# Patient Record
Sex: MALE | Race: WHITE
[De-identification: names, ages, dates, MRNs, and addresses within clinical notes are randomized per-mention and may not be internally consistent; named-entity substitution may affect disease eponyms.]

---

## 2017-04-15 ENCOUNTER — RX RENEWAL (OUTPATIENT)
Age: 62
End: 2017-04-15

## 2017-06-14 ENCOUNTER — RX RENEWAL (OUTPATIENT)
Age: 62
End: 2017-06-14

## 2017-07-24 ENCOUNTER — RESULT REVIEW (OUTPATIENT)
Age: 62
End: 2017-07-24

## 2017-07-24 ENCOUNTER — NON-APPOINTMENT (OUTPATIENT)
Age: 62
End: 2017-07-24

## 2017-07-24 ENCOUNTER — APPOINTMENT (OUTPATIENT)
Dept: INTERNAL MEDICINE | Facility: CLINIC | Age: 62
End: 2017-07-24

## 2017-07-24 DIAGNOSIS — Z98.61 CORONARY ANGIOPLASTY STATUS: ICD-10-CM

## 2017-07-24 LAB
BILIRUB UR QL STRIP: NORMAL
GLUCOSE UR-MCNC: NORMAL
HCG UR QL: 0.2 EU/DL
HGB UR QL STRIP.AUTO: NORMAL
KETONES UR-MCNC: NORMAL
LEUKOCYTE ESTERASE UR QL STRIP: NORMAL
NITRITE UR QL STRIP: NORMAL
PH UR STRIP: 5.5
PROT UR STRIP-MCNC: NORMAL
SP GR UR STRIP: 1.01

## 2017-07-25 LAB
ALBUMIN SERPL ELPH-MCNC: 4.7 G/DL
ALP BLD-CCNC: 80 U/L
ALT SERPL-CCNC: 31 U/L
ANION GAP SERPL CALC-SCNC: 18 MMOL/L
AST SERPL-CCNC: 18 U/L
BASOPHILS # BLD AUTO: 0.06 K/UL
BASOPHILS NFR BLD AUTO: 0.7 %
BILIRUB SERPL-MCNC: 0.6 MG/DL
BUN SERPL-MCNC: 22 MG/DL
CALCIUM SERPL-MCNC: 9.2 MG/DL
CHLORIDE SERPL-SCNC: 100 MMOL/L
CHOLEST SERPL-MCNC: 167 MG/DL
CHOLEST/HDLC SERPL: 4.5 RATIO
CO2 SERPL-SCNC: 21 MMOL/L
CREAT SERPL-MCNC: 1.28 MG/DL
EOSINOPHIL # BLD AUTO: 0.24 K/UL
EOSINOPHIL NFR BLD AUTO: 2.9 %
GLUCOSE SERPL-MCNC: 101 MG/DL
HCT VFR BLD CALC: 42.6 %
HCV AB SER QL: NONREACTIVE
HCV S/CO RATIO: 0.14 S/CO
HDLC SERPL-MCNC: 37 MG/DL
HGB BLD-MCNC: 14.6 G/DL
IMM GRANULOCYTES NFR BLD AUTO: 0.4 %
LDLC SERPL CALC-MCNC: 78 MG/DL
LYMPHOCYTES # BLD AUTO: 1.43 K/UL
LYMPHOCYTES NFR BLD AUTO: 17.5 %
MAN DIFF?: NORMAL
MCHC RBC-ENTMCNC: 33.4 PG
MCHC RBC-ENTMCNC: 34.3 GM/DL
MCV RBC AUTO: 97.5 FL
MONOCYTES # BLD AUTO: 0.93 K/UL
MONOCYTES NFR BLD AUTO: 11.4 %
NEUTROPHILS # BLD AUTO: 5.48 K/UL
NEUTROPHILS NFR BLD AUTO: 67.1 %
PLATELET # BLD AUTO: 252 K/UL
POTASSIUM SERPL-SCNC: 4.4 MMOL/L
PROT SERPL-MCNC: 7.5 G/DL
PSA FREE FLD-MCNC: 20.9
PSA FREE SERPL-MCNC: 0.53 NG/ML
PSA SERPL-MCNC: 2.53 NG/ML
RBC # BLD: 4.37 M/UL
RBC # FLD: 13.3 %
SODIUM SERPL-SCNC: 139 MMOL/L
TRIGL SERPL-MCNC: 262 MG/DL
TSH SERPL-ACNC: 1.52 UIU/ML
WBC # FLD AUTO: 8.17 K/UL

## 2017-09-12 ENCOUNTER — RX RENEWAL (OUTPATIENT)
Age: 62
End: 2017-09-12

## 2017-10-09 ENCOUNTER — RX RENEWAL (OUTPATIENT)
Age: 62
End: 2017-10-09

## 2017-12-11 ENCOUNTER — MEDICATION RENEWAL (OUTPATIENT)
Age: 62
End: 2017-12-11

## 2018-01-10 ENCOUNTER — RX RENEWAL (OUTPATIENT)
Age: 63
End: 2018-01-10

## 2018-01-10 ENCOUNTER — MEDICATION RENEWAL (OUTPATIENT)
Age: 63
End: 2018-01-10

## 2018-01-17 ENCOUNTER — APPOINTMENT (OUTPATIENT)
Dept: INTERNAL MEDICINE | Facility: CLINIC | Age: 63
End: 2018-01-17
Payer: COMMERCIAL

## 2018-01-17 VITALS
HEIGHT: 71 IN | OXYGEN SATURATION: 94 % | DIASTOLIC BLOOD PRESSURE: 86 MMHG | SYSTOLIC BLOOD PRESSURE: 118 MMHG | HEART RATE: 91 BPM | WEIGHT: 203 LBS | BODY MASS INDEX: 28.42 KG/M2

## 2018-01-17 DIAGNOSIS — G47.00 INSOMNIA, UNSPECIFIED: ICD-10-CM

## 2018-01-17 PROCEDURE — 36415 COLL VENOUS BLD VENIPUNCTURE: CPT

## 2018-01-17 PROCEDURE — 99214 OFFICE O/P EST MOD 30 MIN: CPT | Mod: 25

## 2018-01-17 RX ORDER — DEXLANSOPRAZOLE 60 MG/1
60 CAPSULE, DELAYED RELEASE ORAL
Refills: 0 | Status: ACTIVE | COMMUNITY
Start: 2018-01-17

## 2018-01-18 LAB
ALBUMIN SERPL ELPH-MCNC: 4.9 G/DL
ALP BLD-CCNC: 80 U/L
ALT SERPL-CCNC: 34 U/L
ANION GAP SERPL CALC-SCNC: 14 MMOL/L
AST SERPL-CCNC: 20 U/L
BASOPHILS # BLD AUTO: 0.07 K/UL
BASOPHILS NFR BLD AUTO: 0.8 %
BILIRUB SERPL-MCNC: 0.6 MG/DL
BUN SERPL-MCNC: 18 MG/DL
CALCIUM SERPL-MCNC: 10.3 MG/DL
CHLORIDE SERPL-SCNC: 102 MMOL/L
CO2 SERPL-SCNC: 26 MMOL/L
CREAT SERPL-MCNC: 1.27 MG/DL
EOSINOPHIL # BLD AUTO: 0.32 K/UL
EOSINOPHIL NFR BLD AUTO: 3.8 %
GLUCOSE SERPL-MCNC: 97 MG/DL
HBA1C MFR BLD HPLC: 5.1 %
HCT VFR BLD CALC: 45.7 %
HGB BLD-MCNC: 15.4 G/DL
IMM GRANULOCYTES NFR BLD AUTO: 0.2 %
LYMPHOCYTES # BLD AUTO: 1.8 K/UL
LYMPHOCYTES NFR BLD AUTO: 21.3 %
MAN DIFF?: NORMAL
MCHC RBC-ENTMCNC: 33.7 GM/DL
MCHC RBC-ENTMCNC: 33.7 PG
MCV RBC AUTO: 100 FL
MONOCYTES # BLD AUTO: 0.88 K/UL
MONOCYTES NFR BLD AUTO: 10.4 %
NEUTROPHILS # BLD AUTO: 5.37 K/UL
NEUTROPHILS NFR BLD AUTO: 63.5 %
PLATELET # BLD AUTO: 251 K/UL
POTASSIUM SERPL-SCNC: 4.3 MMOL/L
PROT SERPL-MCNC: 7.5 G/DL
RBC # BLD: 4.57 M/UL
RBC # FLD: 13 %
SODIUM SERPL-SCNC: 142 MMOL/L
WBC # FLD AUTO: 8.46 K/UL

## 2018-09-03 ENCOUNTER — APPOINTMENT (OUTPATIENT)
Dept: INTERNAL MEDICINE | Facility: CLINIC | Age: 63
End: 2018-09-03

## 2018-09-10 ENCOUNTER — APPOINTMENT (OUTPATIENT)
Dept: INTERNAL MEDICINE | Facility: CLINIC | Age: 63
End: 2018-09-10
Payer: COMMERCIAL

## 2018-09-10 VITALS
DIASTOLIC BLOOD PRESSURE: 90 MMHG | HEIGHT: 71 IN | SYSTOLIC BLOOD PRESSURE: 158 MMHG | OXYGEN SATURATION: 96 % | HEART RATE: 78 BPM | BODY MASS INDEX: 30.66 KG/M2 | WEIGHT: 219 LBS

## 2018-09-10 DIAGNOSIS — Z00.00 ENCOUNTER FOR GENERAL ADULT MEDICAL EXAMINATION W/OUT ABNORMAL FINDINGS: ICD-10-CM

## 2018-09-10 PROCEDURE — 90471 IMMUNIZATION ADMIN: CPT

## 2018-09-10 PROCEDURE — 99213 OFFICE O/P EST LOW 20 MIN: CPT | Mod: 25

## 2018-09-10 PROCEDURE — G0444 DEPRESSION SCREEN ANNUAL: CPT

## 2018-09-10 PROCEDURE — 90715 TDAP VACCINE 7 YRS/> IM: CPT

## 2018-09-10 PROCEDURE — G0442 ANNUAL ALCOHOL SCREEN 15 MIN: CPT

## 2018-09-10 PROCEDURE — 99396 PREV VISIT EST AGE 40-64: CPT | Mod: 25

## 2018-09-10 PROCEDURE — 36415 COLL VENOUS BLD VENIPUNCTURE: CPT

## 2018-09-10 NOTE — HISTORY OF PRESENT ILLNESS
[de-identified] : Patient presents for CPE. History is significant for hypertension, hyperlipidemia and coronary artery disease. He is on ramipril and metoprolol for hypertension, Crestor for hyperlipidemia, aspirin Plavix for coronary artery disease. He sees cardiologist, Dr. Delgado regularly. He also has a history of GERD and Negron's esophagus for which he takes excellent and sees Dr. Cabezas regularly. \par He reports that he drinks a quart of hard liquor per day. He would like to stop drinking. He stopped cold turkey in the past on his own but states he had withdrawal symptoms for a week. No withdrawal seizure/DTs. He was sober for many years, started drinking again about a year ago.

## 2018-09-10 NOTE — COUNSELING
[ - Annual Alcohol Misuse Screening] : Annual Alcohol Misuse Screening [ - Annual Depression Screening] : Annual Depression Screening

## 2018-09-10 NOTE — ASSESSMENT
[FreeTextEntry1] : Blood pressure high - continue medications - will repeat next week. \par Discussed plan for alcohol cessation. Will attempt to detox as outpatient with librium taper. States his last drink will be Friday, then will detox at home. Advised him to go to ER for severe withdrawal symptoms, high BP. Will check BP at home. \par He is current with specialists.\par Colonoscopy 10/16 with 5 year follow up.  \par Tdap given in left deltoid. \par Follow up in 1 week.

## 2018-09-10 NOTE — PHYSICAL EXAM
[No Acute Distress] : no acute distress [No Respiratory Distress] : no respiratory distress  [Clear to Auscultation] : lungs were clear to auscultation bilaterally [No Accessory Muscle Use] : no accessory muscle use [Normal Rate] : normal rate  [Regular Rhythm] : with a regular rhythm [Normal S1, S2] : normal S1 and S2 [Normal Affect] : the affect was normal [Alert and Oriented x3] : oriented to person, place, and time [Normal Mood] : the mood was normal [Normal Insight/Judgement] : insight and judgment were intact [Well Nourished] : well nourished [Well Developed] : well developed [Well-Appearing] : well-appearing [Normal Sclera/Conjunctiva] : normal sclera/conjunctiva [PERRL] : pupils equal round and reactive to light [EOMI] : extraocular movements intact [Normal Outer Ear/Nose] : the outer ears and nose were normal in appearance [Normal Oropharynx] : the oropharynx was normal [Normal TMs] : both tympanic membranes were normal [No JVD] : no jugular venous distention [Supple] : supple [No Lymphadenopathy] : no lymphadenopathy [Thyroid Normal, No Nodules] : the thyroid was normal and there were no nodules present [No Murmur] : no murmur heard [No Carotid Bruits] : no carotid bruits [No Abdominal Bruit] : a ~M bruit was not heard ~T in the abdomen [No Varicosities] : no varicosities [Pedal Pulses Present] : the pedal pulses are present [No Edema] : there was no peripheral edema [No Extremity Clubbing/Cyanosis] : no extremity clubbing/cyanosis [No Palpable Aorta] : no palpable aorta [Soft] : abdomen soft [Non Tender] : non-tender [Non-distended] : non-distended [No Masses] : no abdominal mass palpated [No HSM] : no HSM [Normal Bowel Sounds] : normal bowel sounds [Normal Posterior Cervical Nodes] : no posterior cervical lymphadenopathy [Normal Anterior Cervical Nodes] : no anterior cervical lymphadenopathy [No CVA Tenderness] : no CVA  tenderness [No Spinal Tenderness] : no spinal tenderness [No Joint Swelling] : no joint swelling [Grossly Normal Strength/Tone] : grossly normal strength/tone [No Rash] : no rash [Normal Gait] : normal gait [Coordination Grossly Intact] : coordination grossly intact [No Focal Deficits] : no focal deficits [Deep Tendon Reflexes (DTR)] : deep tendon reflexes were 2+ and symmetric

## 2018-09-11 LAB
ALBUMIN SERPL ELPH-MCNC: 5.1 G/DL
ALP BLD-CCNC: 82 U/L
ALT SERPL-CCNC: 36 U/L
ANION GAP SERPL CALC-SCNC: 14 MMOL/L
APPEARANCE: CLEAR
AST SERPL-CCNC: 24 U/L
BACTERIA: NEGATIVE
BASOPHILS # BLD AUTO: 0.04 K/UL
BASOPHILS NFR BLD AUTO: 0.5 %
BILIRUB SERPL-MCNC: 0.8 MG/DL
BILIRUBIN URINE: NEGATIVE
BLOOD URINE: NEGATIVE
BUN SERPL-MCNC: 20 MG/DL
CALCIUM SERPL-MCNC: 9.7 MG/DL
CHLORIDE SERPL-SCNC: 104 MMOL/L
CHOLEST SERPL-MCNC: 212 MG/DL
CHOLEST/HDLC SERPL: 3.9 RATIO
CO2 SERPL-SCNC: 25 MMOL/L
COLOR: YELLOW
CREAT SERPL-MCNC: 1.17 MG/DL
EOSINOPHIL # BLD AUTO: 0.17 K/UL
EOSINOPHIL NFR BLD AUTO: 2.3 %
GLUCOSE QUALITATIVE U: NEGATIVE MG/DL
GLUCOSE SERPL-MCNC: 108 MG/DL
HBA1C MFR BLD HPLC: 5.2 %
HCT VFR BLD CALC: 44.8 %
HDLC SERPL-MCNC: 54 MG/DL
HGB BLD-MCNC: 14.9 G/DL
HYALINE CASTS: 0 /LPF
IMM GRANULOCYTES NFR BLD AUTO: 0.3 %
KETONES URINE: NEGATIVE
LDLC SERPL CALC-MCNC: 124 MG/DL
LEUKOCYTE ESTERASE URINE: NEGATIVE
LYMPHOCYTES # BLD AUTO: 1.33 K/UL
LYMPHOCYTES NFR BLD AUTO: 17.7 %
MAN DIFF?: NORMAL
MCHC RBC-ENTMCNC: 33.3 GM/DL
MCHC RBC-ENTMCNC: 33.6 PG
MCV RBC AUTO: 101.1 FL
MICROSCOPIC-UA: NORMAL
MONOCYTES # BLD AUTO: 0.65 K/UL
MONOCYTES NFR BLD AUTO: 8.6 %
NEUTROPHILS # BLD AUTO: 5.31 K/UL
NEUTROPHILS NFR BLD AUTO: 70.6 %
NITRITE URINE: NEGATIVE
PH URINE: 6
PLATELET # BLD AUTO: 179 K/UL
POTASSIUM SERPL-SCNC: 4.3 MMOL/L
PROT SERPL-MCNC: 7.3 G/DL
PROTEIN URINE: NEGATIVE MG/DL
RBC # BLD: 4.43 M/UL
RBC # FLD: 13.8 %
RED BLOOD CELLS URINE: 3 /HPF
SODIUM SERPL-SCNC: 143 MMOL/L
SPECIFIC GRAVITY URINE: 1.02
SQUAMOUS EPITHELIAL CELLS: 0 /HPF
TRIGL SERPL-MCNC: 172 MG/DL
UROBILINOGEN URINE: NEGATIVE MG/DL
WBC # FLD AUTO: 7.52 K/UL
WHITE BLOOD CELLS URINE: 2 /HPF

## 2018-09-12 LAB
HCV AB SER QL: NONREACTIVE
HCV S/CO RATIO: 0.24 S/CO
PSA SERPL-MCNC: 2.58 NG/ML
TSH SERPL-ACNC: 1.98 UIU/ML

## 2018-09-19 ENCOUNTER — APPOINTMENT (OUTPATIENT)
Dept: INTERNAL MEDICINE | Facility: CLINIC | Age: 63
End: 2018-09-19
Payer: COMMERCIAL

## 2018-09-19 VITALS
SYSTOLIC BLOOD PRESSURE: 142 MMHG | OXYGEN SATURATION: 94 % | HEIGHT: 71 IN | BODY MASS INDEX: 30.94 KG/M2 | DIASTOLIC BLOOD PRESSURE: 86 MMHG | WEIGHT: 221 LBS | HEART RATE: 85 BPM

## 2018-09-19 DIAGNOSIS — Z23 ENCOUNTER FOR IMMUNIZATION: ICD-10-CM

## 2018-09-19 PROCEDURE — 99214 OFFICE O/P EST MOD 30 MIN: CPT

## 2018-09-19 RX ORDER — CHLORDIAZEPOXIDE HYDROCHLORIDE 25 MG/1
25 CAPSULE ORAL
Qty: 15 | Refills: 0 | Status: DISCONTINUED | COMMUNITY
Start: 2018-09-10 | End: 2018-09-19

## 2018-09-19 NOTE — ASSESSMENT
[FreeTextEntry1] : He successfully detoxed from alcohol, now going to AA meetings daily. \par BP high, increase ramipril to 10mg daily. \par Follow up in 6-8 weeks.

## 2018-09-19 NOTE — HISTORY OF PRESENT ILLNESS
[de-identified] : Patient presents for follow up after detoxing from alcohol. Last drink was 6 days ago. He successfully completed detox at home with a librium taper. He is now going to AA meetings. History significant for CAD s/p stent and hypertension. He is on ASA, plavix, crestor for CAD and ramipril and metoprolol for hypertension. BP at home has been around 150/100. He feels well today.

## 2018-09-19 NOTE — PHYSICAL EXAM
[No Acute Distress] : no acute distress [No Respiratory Distress] : no respiratory distress  [Clear to Auscultation] : lungs were clear to auscultation bilaterally [No Accessory Muscle Use] : no accessory muscle use [Normal Rate] : normal rate  [Regular Rhythm] : with a regular rhythm [Normal S1, S2] : normal S1 and S2 [No Murmur] : no murmur heard [No Edema] : there was no peripheral edema [Normal Affect] : the affect was normal [Normal Insight/Judgement] : insight and judgment were intact

## 2018-09-30 ENCOUNTER — RX RENEWAL (OUTPATIENT)
Age: 63
End: 2018-09-30

## 2018-12-13 ENCOUNTER — APPOINTMENT (OUTPATIENT)
Dept: INTERNAL MEDICINE | Facility: CLINIC | Age: 63
End: 2018-12-13
Payer: COMMERCIAL

## 2018-12-13 VITALS
HEIGHT: 71 IN | OXYGEN SATURATION: 97 % | WEIGHT: 214 LBS | HEART RATE: 79 BPM | DIASTOLIC BLOOD PRESSURE: 70 MMHG | BODY MASS INDEX: 29.96 KG/M2 | SYSTOLIC BLOOD PRESSURE: 118 MMHG

## 2018-12-13 DIAGNOSIS — I10 ESSENTIAL (PRIMARY) HYPERTENSION: ICD-10-CM

## 2018-12-13 DIAGNOSIS — F10.10 ALCOHOL ABUSE, UNCOMPLICATED: ICD-10-CM

## 2018-12-13 DIAGNOSIS — E78.5 HYPERLIPIDEMIA, UNSPECIFIED: ICD-10-CM

## 2018-12-13 DIAGNOSIS — I25.10 ATHEROSCLEROTIC HEART DISEASE OF NATIVE CORONARY ARTERY W/OUT ANGINA PECTORIS: ICD-10-CM

## 2018-12-13 PROCEDURE — 99214 OFFICE O/P EST MOD 30 MIN: CPT

## 2018-12-13 NOTE — ASSESSMENT
[FreeTextEntry1] : He is doing very well. Blood pressure is well controlled. Continue current meds. \par Colon 10/16\par Had Flu vaccine \par Will follow up with cards. \par Follow up in 6 months and prn.

## 2018-12-13 NOTE — HISTORY OF PRESENT ILLNESS
[de-identified] : Patient presents for follow up of hypertension, CAD, hyperlipidemia, alcohol abuse. He is on metoprolol, ramipril for hypertensin, crestor, ASA, plavix for hyperlipidemia/CAD. BP has been well controlled at home. He has been sober for 3 months, has good support system. No acute complaints today.

## 2018-12-26 ENCOUNTER — RX RENEWAL (OUTPATIENT)
Age: 63
End: 2018-12-26

## 2019-02-04 ENCOUNTER — RX RENEWAL (OUTPATIENT)
Age: 64
End: 2019-02-04

## 2019-03-11 ENCOUNTER — RX RENEWAL (OUTPATIENT)
Age: 64
End: 2019-03-11

## 2019-03-11 RX ORDER — RAMIPRIL 5 MG/1
5 CAPSULE ORAL
Qty: 90 | Refills: 1 | Status: ACTIVE | COMMUNITY
Start: 2019-02-04 | End: 1900-01-01

## 2019-05-03 ENCOUNTER — RX RENEWAL (OUTPATIENT)
Age: 64
End: 2019-05-03

## 2019-10-30 ENCOUNTER — RX RENEWAL (OUTPATIENT)
Age: 64
End: 2019-10-30

## 2019-10-30 RX ORDER — ROSUVASTATIN CALCIUM 5 MG/1
5 TABLET, FILM COATED ORAL
Qty: 90 | Refills: 1 | Status: ACTIVE | COMMUNITY
Start: 2017-09-12 | End: 1900-01-01

## 2019-12-31 ENCOUNTER — RX RENEWAL (OUTPATIENT)
Age: 64
End: 2019-12-31

## 2020-01-02 ENCOUNTER — RX RENEWAL (OUTPATIENT)
Age: 65
End: 2020-01-02